# Patient Record
Sex: MALE | Race: BLACK OR AFRICAN AMERICAN | NOT HISPANIC OR LATINO | Employment: FULL TIME | URBAN - METROPOLITAN AREA
[De-identification: names, ages, dates, MRNs, and addresses within clinical notes are randomized per-mention and may not be internally consistent; named-entity substitution may affect disease eponyms.]

---

## 2021-04-27 ENCOUNTER — IMMUNIZATION (OUTPATIENT)
Dept: FAMILY PLANNING/WOMEN'S HEALTH CLINIC | Facility: IMMUNIZATION CENTER | Age: 61
End: 2021-04-27
Payer: COMMERCIAL

## 2021-04-27 DIAGNOSIS — Z23 ENCOUNTER FOR VACCINATION: Primary | ICD-10-CM

## 2021-04-27 PROCEDURE — 0001A PFIZER SARS-COV-2 VACCINE: CPT | Performed by: INTERNAL MEDICINE

## 2021-04-27 PROCEDURE — 91300 PFIZER SARS-COV-2 VACCINE: CPT | Performed by: INTERNAL MEDICINE

## 2021-05-21 ENCOUNTER — IMMUNIZATION (OUTPATIENT)
Dept: FAMILY PLANNING/WOMEN'S HEALTH CLINIC | Facility: IMMUNIZATION CENTER | Age: 61
End: 2021-05-21
Attending: INTERNAL MEDICINE
Payer: COMMERCIAL

## 2021-05-21 DIAGNOSIS — Z23 ENCOUNTER FOR VACCINATION: Primary | ICD-10-CM

## 2021-05-21 PROCEDURE — 0002A PFIZER SARS-COV-2 VACCINE: CPT | Performed by: INTERNAL MEDICINE

## 2021-05-21 PROCEDURE — 91300 PFIZER SARS-COV-2 VACCINE: CPT | Performed by: INTERNAL MEDICINE

## 2022-04-20 ENCOUNTER — OFFICE VISIT (OUTPATIENT)
Dept: MEDICAL GROUP | Facility: PHYSICIAN GROUP | Age: 62
End: 2022-04-20
Payer: COMMERCIAL

## 2022-04-20 ENCOUNTER — NON-PROVIDER VISIT (OUTPATIENT)
Dept: URGENT CARE | Facility: PHYSICIAN GROUP | Age: 62
End: 2022-04-20
Payer: COMMERCIAL

## 2022-04-20 ENCOUNTER — APPOINTMENT (OUTPATIENT)
Dept: RADIOLOGY | Facility: IMAGING CENTER | Age: 62
End: 2022-04-20
Attending: FAMILY MEDICINE
Payer: COMMERCIAL

## 2022-04-20 VITALS
HEIGHT: 68 IN | DIASTOLIC BLOOD PRESSURE: 98 MMHG | SYSTOLIC BLOOD PRESSURE: 192 MMHG | BODY MASS INDEX: 30.77 KG/M2 | WEIGHT: 203 LBS | RESPIRATION RATE: 16 BRPM | TEMPERATURE: 97.2 F | HEART RATE: 93 BPM | OXYGEN SATURATION: 95 %

## 2022-04-20 DIAGNOSIS — E78.5 DYSLIPIDEMIA: ICD-10-CM

## 2022-04-20 DIAGNOSIS — M51.26 LUMBAR HERNIATED DISC: ICD-10-CM

## 2022-04-20 DIAGNOSIS — Z12.11 SCREENING FOR COLON CANCER: ICD-10-CM

## 2022-04-20 DIAGNOSIS — I10 PRIMARY HYPERTENSION: ICD-10-CM

## 2022-04-20 PROCEDURE — 72100 X-RAY EXAM L-S SPINE 2/3 VWS: CPT | Mod: TC,FY | Performed by: RADIOLOGY

## 2022-04-20 PROCEDURE — 99204 OFFICE O/P NEW MOD 45 MIN: CPT | Performed by: FAMILY MEDICINE

## 2022-04-20 RX ORDER — AMLODIPINE BESYLATE 10 MG/1
10 TABLET ORAL DAILY
Qty: 90 TABLET | Refills: 3 | Status: SHIPPED | OUTPATIENT
Start: 2022-04-20 | End: 2023-05-24 | Stop reason: SDUPTHER

## 2022-04-20 RX ORDER — HYDROCHLOROTHIAZIDE 25 MG/1
25 TABLET ORAL DAILY
Qty: 30 TABLET | Refills: 5 | Status: SHIPPED | OUTPATIENT
Start: 2022-04-20 | End: 2022-09-29 | Stop reason: SDUPTHER

## 2022-04-20 ASSESSMENT — PATIENT HEALTH QUESTIONNAIRE - PHQ9: CLINICAL INTERPRETATION OF PHQ2 SCORE: 0

## 2022-04-20 NOTE — PATIENT INSTRUCTIONS
Mediterranean Diet  A Mediterranean diet refers to food and lifestyle choices that are based on the traditions of countries located on the [place]. This way of eating has been shown to help prevent certain conditions and improve outcomes for people who have chronic diseases, like kidney disease and heart disease.  What are tips for following this plan?  Lifestyle  · Cook and eat meals together with your family, when possible.  · Drink enough fluid to keep your urine clear or pale yellow.  · Be physically active every day. This includes:  ? Aerobic exercise like running or swimming.  ? Leisure activities like gardening, walking, or housework.  · Get 7-8 hours of sleep each night.  · If recommended by your health care provider, drink red wine in moderation. This means 1 glass a day for nonpregnant women and 2 glasses a day for men. A glass of wine equals 5 oz (150 mL).  Reading food labels    · Check the serving size of packaged foods. For foods such as rice and pasta, the serving size refers to the amount of cooked product, not dry.  · Check the total fat in packaged foods. Avoid foods that have saturated fat or trans fats.  · Check the ingredients list for added sugars, such as corn syrup.  Shopping  · At the grocery store, buy most of your food from the areas near the walls of the store. This includes:  ? Fresh fruits and vegetables (produce).  ? Grains, beans, nuts, and seeds. Some of these may be available in unpackaged forms or large amounts (in bulk).  ? Fresh seafood.  ? Poultry and eggs.  ? Low-fat dairy products.  · Buy whole ingredients instead of prepackaged foods.  · Buy fresh fruits and vegetables in-season from local farmers markets.  · Buy frozen fruits and vegetables in resealable bags.  · If you do not have access to quality fresh seafood, buy precooked frozen shrimp or canned fish, such as tuna, salmon, or sardines.  · Buy small amounts of raw or cooked vegetables, salads, or olives from  the deli or salad bar at your store.  · Stock your pantry so you always have certain foods on hand, such as olive oil, canned tuna, canned tomatoes, rice, pasta, and beans.  Cooking  · Cook foods with extra-virgin olive oil instead of using butter or other vegetable oils.  · Have meat as a side dish, and have vegetables or grains as your main dish. This means having meat in small portions or adding small amounts of meat to foods like pasta or stew.  · Use beans or vegetables instead of meat in common dishes like chili or lasagna.  · [place] with different cooking methods. Try roasting or broiling vegetables instead of steaming or sautéeing them.  · Add frozen vegetables to soups, stews, pasta, or rice.  · Add nuts or seeds for added healthy fat at each meal. You can add these to yogurt, salads, or vegetable dishes.  · Marinate fish or vegetables using olive oil, lemon juice, garlic, and fresh herbs.  Meal planning    · Plan to eat 1 vegetarian meal one day each week. Try to work up to 2 vegetarian meals, if possible.  · Eat seafood 2 or more times a week.  · Have healthy snacks readily available, such as:  ? Vegetable sticks with hummus.  ? Greek yogurt.  ? Fruit and nut trail mix.  · Eat balanced meals throughout the week. This includes:  ? Fruit: 2-3 servings a day  ? Vegetables: 4-5 servings a day  ? Low-fat dairy: 2 servings a day  ? Fish, poultry, or lean meat: 1 serving a day  ? Beans and legumes: 2 or more servings a week  ? Nuts and seeds: 1-2 servings a day  ? Whole grains: 6-8 servings a day  ? Extra-virgin olive oil: 3-4 servings a day  · Limit red meat and sweets to only a few servings a month  What are my food choices?  · Mediterranean diet  ? Recommended  § Grains: Whole-grain pasta. Brown rice. Bulgar wheat. Polenta. Couscous. Whole-wheat bread. Oatmeal. Quinoa.  § Vegetables: Artichokes. Beets. Broccoli. Cabbage. Carrots. Eggplant. Green beans. Chard. Kale. Spinach. Onions. Leeks. Peas. Squash.  Tomatoes. Peppers. Radishes.  § Fruits: Apples. Apricots. Avocado. Berries. Bananas. Cherries. Dates. Figs. Grapes. Pennie. Melon. Oranges. Peaches. Plums. Pomegranate.  § Meats and other protein foods: Beans. Almonds. Sunflower seeds. Pine nuts. Peanuts. Cod. [place]. Scallops. Shrimp. Tuna. Tilapia. Clams. Oysters. Eggs.  § Dairy: Low-fat milk. Cheese. Greek yogurt.  § Beverages: Water. Red wine. Herbal tea.  § Fats and oils: Extra virgin olive oil. Avocado oil. Grape seed oil.  § Sweets and desserts: Greek yogurt with honey. Baked apples. Poached pears. Trail mix.  § Seasoning and other foods: Basil. Cilantro. Coriander. Cumin. Mint. Parsley. Alo. Rosemary. Tarragon. Garlic. Oregano. Thyme. Pepper. Balsalmic vinegar. Tahini. Hummus. Tomato sauce. Olives. Mushrooms.  ? Limit these  § Grains: Prepackaged pasta or rice dishes. Prepackaged cereal with added sugar.  § Vegetables: Deep fried potatoes (french fries).  § Fruits: Fruit canned in syrup.  § Meats and other protein foods: Beef. Pork. Lamb. Poultry with skin. Hot dogs. [place]ouch.  § Dairy: Ice cream. Sour cream. Whole milk.  § Beverages: Juice. Sugar-sweetened soft drinks. Beer. Liquor and spirits.  § Fats and oils: Butter. Canola oil. Vegetable oil. Beef fat (tallow). Lard.  § Sweets and desserts: Cookies. Cakes. Pies. Candy.  § Seasoning and other foods: Mayonnaise. Premade sauces and marinades.  The items listed may not be a complete list. Talk with your dietitian about what dietary choices are right for you.  Summary  · The Mediterranean diet includes both food and lifestyle choices.  · Eat a variety of fresh fruits and vegetables, beans, nuts, seeds, and whole grains.  · Limit the amount of red meat and sweets that you eat.  · Talk with your health care provider about whether it is safe for you to drink red wine in moderation. This means 1 glass a day for nonpregnant women and 2 glasses a day for men. A glass of wine equals 5 oz (150 mL).  This information  is not intended to replace advice given to you by your health care provider. Make sure you discuss any questions you have with your health care provider.  Document Released: 08/10/2017 Document Revised: 08/17/2017 Document Reviewed: 08/10/2017  Elsevier Patient Education © 2020 [employer] Inc.

## 2022-04-20 NOTE — ASSESSMENT & PLAN NOTE
He had allergic reaction anaphylaxis after 4-5 years of taking lisinopril:   angioedema around his jaw, lips and nasal cavity.   He also has stress with work and white coat hypertension.     He continues to have the occasional swelling around his face even since off the lisinopril for years.     Will restart amlodipine 10 mg, hctz

## 2022-04-20 NOTE — ASSESSMENT & PLAN NOTE
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   Has back pain for about 5 years. last MRI was over 5 years ago. Pain radiates down his legs at times, mostly left side.   Pain in low back exacerbated last week, he was unable to walk upright and so he missed work for a week.   Pain does not wake him at night.   Off work 4/13/22 to 4/27/22    He will provide FMLA forms for me which I can fill for him next week.   He has been to a chiropractor it did help some.   He needs an xray and referral to PT.   He has reactions to medications including angioedema and anaphylaxis with lisinopril.

## 2022-04-22 NOTE — PROGRESS NOTES
"Subjective:   Eduardo Harrison is a 61 y.o. male here today for evaluation and management of:     Primary hypertension  He had allergic reaction anaphylaxis after 4-5 years of taking lisinopril:   angioedema around his jaw, lips and nasal cavity.   He also has stress with work and white coat hypertension.     He continues to have the occasional swelling around his face even since off the lisinopril for years.     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   Has back pain for about 5 years. last MRI was over 5 years ago. Pain radiates down his legs at times, mostly left side.   Pain in low back exacerbated last week, he was unable to walk upright and so he missed work for a week.   Pain does not wake him at night.   Off work 4/13/22 to 4/27/22    He will provide FMLA forms for me which I can fill for him next week.   He has been to a chiropractor it did help some.   He needs an xray and referral to PT.   He has reactions to medications including angioedema and anaphylaxis with lisinopril.            Current medicines (including changes today)  Current Outpatient Medications   Medication Sig Dispense Refill   • amLODIPine (NORVASC) 10 MG Tab Take 1 Tablet by mouth every day. 90 Tablet 3   • hydroCHLOROthiazide (HYDRODIURIL) 25 MG Tab Take 1 Tablet by mouth every day. For high blood pressure 30 Tablet 5     No current facility-administered medications for this visit.     He  has no past medical history on file.    ROS  No chest pain, no shortness of breath, no abdominal pain       Objective:     BP (!) 192/98   Pulse 93   Temp 36.2 °C (97.2 °F) (Temporal)   Resp 16   Ht 1.727 m (5' 8\")   Wt 92.1 kg (203 lb)   SpO2 95%  Body mass index is 30.87 kg/m².   Physical Exam:  Constitutional: Alert, no distress.  Skin: Warm, dry, good turgor, no rashes in visible areas.  Eye: Equal, round and reactive, conjunctiva clear, lids " normal.  ENMT: Lips without lesions, good dentition, oropharynx clear.  Neck: Trachea midline, no masses, no thyromegaly. No cervical or supraclavicular lymphadenopathy  Respiratory: Unlabored respiratory effort, lungs clear to auscultation, no wheezes, no ronchi.  Cardiovascular: Normal S1, S2, no murmur, no edema.  Abdomen: Soft, non-tender, no masses, no hepatosplenomegaly.  Psych: Alert and oriented x3, normal affect and mood.        Assessment and Plan:   The following treatment plan was discussed    1. Primary hypertension    - Comp Metabolic Panel; Future    2. Lumbar herniated disc    - DX-LUMBAR SPINE-2 OR 3 VIEWS; Future  - Referral to Physical Therapy    3. Screening for colon cancer    - Referral to GI for Colonoscopy    4. Dyslipidemia    - Lipid Profile; Future      Followup: Return in about 1 year (around 4/20/2023).

## 2022-04-22 NOTE — RESULT ENCOUNTER NOTE
Please advise patient that I reviewed back xray results. There is no fracture or compression fracture, there is mild to moderate arthritis. For now can use gentle stretches, tylenol, ice, till appt on 4/26/22  Wendi Eid M.D.

## 2022-04-26 ENCOUNTER — OFFICE VISIT (OUTPATIENT)
Dept: MEDICAL GROUP | Facility: PHYSICIAN GROUP | Age: 62
End: 2022-04-26
Payer: COMMERCIAL

## 2022-04-26 VITALS
HEART RATE: 100 BPM | DIASTOLIC BLOOD PRESSURE: 88 MMHG | BODY MASS INDEX: 30.46 KG/M2 | SYSTOLIC BLOOD PRESSURE: 138 MMHG | WEIGHT: 201 LBS | RESPIRATION RATE: 14 BRPM | OXYGEN SATURATION: 96 % | TEMPERATURE: 97.2 F | HEIGHT: 68 IN

## 2022-04-26 DIAGNOSIS — M51.26 LUMBAR HERNIATED DISC: ICD-10-CM

## 2022-04-26 DIAGNOSIS — I10 PRIMARY HYPERTENSION: ICD-10-CM

## 2022-04-26 DIAGNOSIS — E66.9 OBESITY (BMI 30-39.9): ICD-10-CM

## 2022-04-26 PROCEDURE — 99214 OFFICE O/P EST MOD 30 MIN: CPT | Performed by: FAMILY MEDICINE

## 2022-04-26 RX ORDER — CLOSTRIDIUM TETANI TOXOID ANTIGEN (FORMALDEHYDE INACTIVATED), CORYNEBACTERIUM DIPHTHERIAE TOXOID ANTIGEN (FORMALDEHYDE INACTIVATED), BORDETELLA PERTUSSIS TOXOID ANTIGEN (GLUTARALDEHYDE INACTIVATED), BORDETELLA PERTUSSIS FILAMENTOUS HEMAGGLUTININ ANTIGEN (FORMALDEHYDE INACTIVATED), BORDETELLA PERTUSSIS PERTACTIN ANTIGEN, AND BORDETELLA PERTUSSIS FIMBRIAE 2/3 ANTIGEN 5; 2; 2.5; 5; 3; 5 [LF]/.5ML; [LF]/.5ML; UG/.5ML; UG/.5ML; UG/.5ML; UG/.5ML
0.5 INJECTION, SUSPENSION INTRAMUSCULAR ONCE
Qty: 0.5 ML | Refills: 0 | Status: SHIPPED
Start: 2022-04-26 | End: 2022-04-26

## 2022-04-26 NOTE — PROGRESS NOTES
"Subjective:   Eduardo Harrison is a 61 y.o. male here today for evaluation and management of:     Primary hypertension  Sig improvement in BP from 192/98 to 138/88   He feels well with no chest pain or LE edema  He continues on amlodipine 10 mg and HCTZ 25 mg      Lumbar herniated disc  Release back to work with no restrictions paperwork provided.   Accommodation for days off for flare up of low back pain with sciatica provided he has completed PT and chiropractic therapy in the past.   occ flare up with severe pain and sciatica can cause him to be out of work from 5-14 days rarely. Maybe happens once in 3 months.          Current medicines (including changes today)  Current Outpatient Medications   Medication Sig Dispense Refill   • amLODIPine (NORVASC) 10 MG Tab Take 1 Tablet by mouth every day. 90 Tablet 3   • hydroCHLOROthiazide (HYDRODIURIL) 25 MG Tab Take 1 Tablet by mouth every day. For high blood pressure 30 Tablet 5     No current facility-administered medications for this visit.     He  has no past medical history on file.    ROS  No chest pain, no shortness of breath, no abdominal pain       Objective:     /88   Pulse 100   Temp 36.2 °C (97.2 °F) (Temporal)   Resp 14   Ht 1.727 m (5' 8\")   Wt 91.2 kg (201 lb)   SpO2 96%  Body mass index is 30.56 kg/m².   Physical Exam:  Constitutional: Alert, no distress.  Skin: Warm, dry, good turgor, no rashes in visible areas.  Eye: Equal, round and reactive, conjunctiva clear, lids normal.  ENMT: Lips without lesions, good dentition, oropharynx clear.  Neck: Trachea midline, no masses, no thyromegaly. No cervical or supraclavicular lymphadenopathy  Respiratory: Unlabored respiratory effort, lungs clear to auscultation, no wheezes, no ronchi.  Cardiovascular: Normal S1, S2, no murmur, no edema.  Abdomen: Soft, non-tender, no masses, no hepatosplenomegaly.  Psych: Alert and oriented x3, normal affect and mood.        Assessment and Plan:   The following " treatment plan was discussed    1. Primary hypertension      2. Lumbar herniated disc      3. Obesity (BMI 30-39.9)    - Patient identified as having weight management issue.  Appropriate orders and counseling given.      Followup: Return in about 3 months (around 7/26/2022) for HTN.

## 2022-04-26 NOTE — ASSESSMENT & PLAN NOTE
Sig improvement in BP from 192/98 to 138/88   He feels well with no chest pain or LE edema  He continues on amlodipine 10 mg and HCTZ 25 mg

## 2022-04-26 NOTE — ASSESSMENT & PLAN NOTE
Release back to work with no restrictions paperwork provided.   Accommodation for days off for flare up of low back pain with sciatica provided he has completed PT and chiropractic therapy in the past.   occ flare up with severe pain and sciatica can cause him to be out of work from 5-14 days rarely. Maybe happens once in 3 months.

## 2022-06-07 ENCOUNTER — OFFICE VISIT (OUTPATIENT)
Dept: MEDICAL GROUP | Facility: PHYSICIAN GROUP | Age: 62
End: 2022-06-07
Payer: COMMERCIAL

## 2022-06-07 VITALS
WEIGHT: 198 LBS | BODY MASS INDEX: 30.01 KG/M2 | HEIGHT: 68 IN | SYSTOLIC BLOOD PRESSURE: 130 MMHG | DIASTOLIC BLOOD PRESSURE: 80 MMHG | OXYGEN SATURATION: 98 % | RESPIRATION RATE: 16 BRPM | HEART RATE: 86 BPM | TEMPERATURE: 98.7 F

## 2022-06-07 DIAGNOSIS — I10 PRIMARY HYPERTENSION: ICD-10-CM

## 2022-06-07 DIAGNOSIS — M51.26 LUMBAR HERNIATED DISC: ICD-10-CM

## 2022-06-07 PROCEDURE — 99214 OFFICE O/P EST MOD 30 MIN: CPT | Performed by: NURSE PRACTITIONER

## 2022-06-07 NOTE — ASSESSMENT & PLAN NOTE
Chronic and stable condition.  Patient continues to report that he has lower back pain that waxes and wanes with occasional sciatica since 2015.  He denies any worsening of his condition however is requesting disability paperwork to be completed today in clinic.  He reports he occasionally gets flares which makes it difficult for him to go to work at [employer].  His occasional back pain flares have previously caused him to be out of work for 5 to 14 days and happens about once every 3 months.    Today he denies any lower back pain just a dull ache.  He denies any numbness or tingling running down his legs, saddle paresthesia, or loss of bowel or bladder control.  He is currently followed by a chiropractor.  He does use heat and ice therapy as needed.  Patient has not started physical therapy

## 2022-06-07 NOTE — PROGRESS NOTES
Chief Complaint   Patient presents with   • Paperwork       Subjective:     HPI:   Eduardo Harrison is a 61 y.o. male here to discuss the evaluation and management of:      Lumbar herniated disc  Chronic and stable condition.  Patient continues to report that he has lower back pain that waxes and wanes with occasional sciatica since 2015.  He denies any worsening of his condition however is requesting disability paperwork to be completed today in clinic.  He reports he occasionally gets flares which makes it difficult for him to go to work at [employer].  His occasional back pain flares have previously caused him to be out of work for 5 to 14 days and happens about once every 3 months.    Today he denies any lower back pain just a dull ache.  He denies any numbness or tingling running down his legs, saddle paresthesia, or loss of bowel or bladder control.  He is currently followed by a chiropractor.  He does use heat and ice therapy as needed.  Patient has not started physical therapy      Primary hypertension  Chronic condition.  Patient is currently taking amlodipine 10 mg daily and hydrochlorothiazide 25 mg daily.  He reports he tolerated medications well without any adverse effects.  He denies any headaches, dizziness, vision changes, or chest pain.  Blood pressure today in clinic is 148 /90with repeat blood pressure of 130/80.        ROS:  Gen: no fevers/chills, no changes in weight  Pulm: no sob, no cough  CV: no chest pain, no palpitations  GI: no nausea/vomiting, no diarrhea  MSk: Positive per HPI  Neuro: no headaches, no numbness/tingling      Allergies   Allergen Reactions   • Lisinopril        Current medicines (including changes today)  Current Outpatient Medications   Medication Sig Dispense Refill   • amLODIPine (NORVASC) 10 MG Tab Take 1 Tablet by mouth every day. 90 Tablet 3   • hydroCHLOROthiazide (HYDRODIURIL) 25 MG Tab Take 1 Tablet by mouth every day. For high blood pressure 30 Tablet 5     No current  "facility-administered medications for this visit.          Objective:       /80   Pulse 86   Temp 37.1 °C (98.7 °F) (Temporal)   Resp 16   Ht 1.727 m (5' 8\")   Wt 89.8 kg (198 lb)   SpO2 98%  Body mass index is 30.11 kg/m².    Physical Exam:  Constitutional: Well-developed and well-nourished male in NAD. Not diaphoretic. No distress.   Skin: warm, dry, intact  Cardiovascular: Regular rate and rhythm without murmur. Radial pulses are intact and equal bilaterally.  Pulmonary: Clear to ausculation. Normal effort. No rales, ronchi, or wheezing.  Back: Full ROM, 5/5 LE strength, sensation intact bilaterally in LE, mild  TTP over spinous processes lumbar spine and paraspinals.  SI joint negative, straight leg raise negative.  Patellar DTR's 2+.  Neurological: Alert and oriented x 3.   Psychiatric:  Behavior, mood, and affect are appropriate.      Assessment and Plan:     The following treatment plan was discussed:    1. Lumbar herniated disc  Chronic condition without exacerbation.  Patient can continue with cryo and heat therapy as needed.  Continue following with chiropractor.  Highly encourage patient to schedule appointment with physical therapy.  Disability paperwork was filled out with the patient and faxed to employer and scanned in epic.    2. Primary hypertension  Chronic condition.  Patient will continue with amlodipine and hydrochlorothiazide.    Any change or worsening of signs or symptoms, patient encouraged to follow-up or report to urgent care or emergency room for further evaluation. Patient verbalizes understanding and agrees.    Follow-Up: Return in about 7 weeks (around 7/26/2022) for Back pain, hypertension.      PLEASE NOTE: This dictation was created using voice recognition software. I have made every reasonable attempt to correct obvious errors, but I expect that there are errors of grammar and possibly content that I did not discover before finalizing the note.  "

## 2022-06-09 ENCOUNTER — APPOINTMENT (OUTPATIENT)
Dept: RADIOLOGY | Facility: IMAGING CENTER | Age: 62
End: 2022-06-09
Attending: STUDENT IN AN ORGANIZED HEALTH CARE EDUCATION/TRAINING PROGRAM
Payer: COMMERCIAL

## 2022-06-09 ENCOUNTER — OFFICE VISIT (OUTPATIENT)
Dept: URGENT CARE | Facility: PHYSICIAN GROUP | Age: 62
End: 2022-06-09
Payer: COMMERCIAL

## 2022-06-09 VITALS
HEIGHT: 68 IN | RESPIRATION RATE: 16 BRPM | SYSTOLIC BLOOD PRESSURE: 148 MMHG | WEIGHT: 200 LBS | DIASTOLIC BLOOD PRESSURE: 84 MMHG | TEMPERATURE: 97.1 F | BODY MASS INDEX: 30.31 KG/M2 | HEART RATE: 77 BPM | OXYGEN SATURATION: 99 %

## 2022-06-09 DIAGNOSIS — S69.92XA INJURY OF LEFT HAND, INITIAL ENCOUNTER: ICD-10-CM

## 2022-06-09 DIAGNOSIS — S63.502A SPRAIN OF LEFT WRIST, INITIAL ENCOUNTER: ICD-10-CM

## 2022-06-09 PROCEDURE — 99213 OFFICE O/P EST LOW 20 MIN: CPT | Performed by: STUDENT IN AN ORGANIZED HEALTH CARE EDUCATION/TRAINING PROGRAM

## 2022-06-09 PROCEDURE — 73110 X-RAY EXAM OF WRIST: CPT | Mod: TC,FY,LT | Performed by: STUDENT IN AN ORGANIZED HEALTH CARE EDUCATION/TRAINING PROGRAM

## 2022-06-09 NOTE — PROGRESS NOTES
"Subjective:   Eduardo Harrison is a 61 y.o. male who presents for Wrist Injury (X 1 week ago, had wrist wrapped. Feels popping around the wrist. )      HPI:  Pleasant 61-year-old male presents to clinic for left wrist injury that occurred 1 week ago.  Patient states he was working on a car and hit his wrist on a piece of metal.  He states that he initially had some pain but is not having any pain at this time.  He states that his only complaint right now is sometimes in his wrist he feels a little bit of popping and clicking and is here for an x-ray to make sure that nothing is broken and he may go to work without any restrictions.  Patient denies fever, chills, numbness, tingling, burning, radiation of pain into the hand or up the arm, reduced range of motion, weakness, dizziness, headache, nausea, vomiting, abdominal pain, chest pain, shortness of breath.      Medications:    • amLODIPine Tabs  • hydroCHLOROthiazide Tabs    Allergies: Lisinopril    Problem List: Eduardo Harrison does not have any pertinent problems on file.    Surgical History:  No past surgical history on file.    Past Social Hx: Eduardo Harrison  reports that he has never smoked. He has never used smokeless tobacco. He reports that he does not drink alcohol and does not use drugs.     Past Family Hx:  Eduardo Harrison family history is not on file.     Problem list, medications, and allergies reviewed by myself today in Epic.     Objective:     BP (!) 148/84   Pulse 77   Temp 36.2 °C (97.1 °F) (Temporal)   Resp 16   Ht 1.727 m (5' 8\")   Wt 90.7 kg (200 lb)   SpO2 99%   BMI 30.41 kg/m²     Physical Exam  Vitals reviewed.   Constitutional:       General: He is not in acute distress.     Appearance: Normal appearance.   Cardiovascular:      Rate and Rhythm: Normal rate and regular rhythm.      Pulses: Normal pulses.      Heart sounds: Normal heart sounds. No murmur heard.  Pulmonary:      Effort: Pulmonary effort is normal. No respiratory distress.      " Breath sounds: Normal breath sounds. No stridor. No wheezing, rhonchi or rales.   Musculoskeletal:      Right wrist: Normal.      Left wrist: No swelling, deformity, effusion, lacerations, tenderness, bony tenderness, snuff box tenderness or crepitus. Normal range of motion. Normal pulse.      Cervical back: Normal range of motion.   Lymphadenopathy:      Cervical: No cervical adenopathy.   Skin:     General: Skin is warm and dry.      Capillary Refill: Capillary refill takes less than 2 seconds.      Findings: No erythema, lesion or rash.   Neurological:      General: No focal deficit present.      Mental Status: He is alert and oriented to person, place, and time.         RADIOLOGY RESULTS   DX-WRIST-COMPLETE 3+ LEFT    Result Date: 6/9/2022 6/9/2022 10:27 AM HISTORY/REASON FOR EXAM:  Pain/Deformity Following Trauma. Popping sensation in the rest for 1 week TECHNIQUE/EXAM DESCRIPTION AND NUMBER OF VIEWS:  4 views of the  LEFT wrist. COMPARISON: None FINDINGS: MINERALIZATION: Mineralization is unremarkable for age. INJURY: No acute fracture or gross malalignment is seen. JOINTS: No erosive arthropathy is evident.     No radiographic evidence of acute traumatic injury.           Assessment/Plan:     Diagnosis and associated orders:     1. Injury of left hand, initial encounter  DX-WRIST-COMPLETE 3+ LEFT   2. Sprain of left wrist, initial encounter        Comments/MDM:     • Patient presents to clinic for evaluation of left wrist injury that occurred 1 week ago.  X-rays show no radiographic evidence of acute traumatic injury.  Discussed with patient that he most likely had a wrist sprain or wrist contusion that is healing appropriately.  • Patient's physical exam shows no abnormal findings.  Patient has no tenderness to palpation.  Patient has full active and passive range of motion.  Patient has 5 out of 5  strength and 5 out of 5 strength during wrist flexion and wrist extension.  • May use ibuprofen and  Tylenol as needed.  Patient may continue to ice or use heat on the wrist if he feels that this is helpful.  • Patient was given a work note stating that he may go to work without any restrictions.  • ED/return precautions were given.  Patient has good understanding the signs and symptoms of warrant immediate reevaluation.         Differential diagnosis, natural history, supportive care, and indications for immediate follow-up discussed.    Advised the patient to follow-up with the primary care physician for recheck, reevaluation, and consideration of further management.    Please note that this dictation was created using voice recognition software. I have made a reasonable attempt to correct obvious errors, but I expect that there are errors of grammar and possibly content that I did not discover before finalizing the note.    Electronically signed by Petey Haynes PA-C.

## 2022-06-09 NOTE — LETTER
[facility] [address] [address]     June 9, 2022    Patient: Eduardo Harrison   YOB: 1960   Date of Visit: 6/9/2022       To Whom It May Concern:    Eduardo Harrison was seen and treated in our department on 6/9/2022.  Patient may return to work at this time without restriction.    Sincerely,     Petey Haynes P.A.-C.

## 2022-09-29 RX ORDER — HYDROCHLOROTHIAZIDE 25 MG/1
25 TABLET ORAL DAILY
Qty: 30 TABLET | Refills: 1 | Status: SHIPPED | OUTPATIENT
Start: 2022-09-29 | End: 2022-12-20 | Stop reason: SDUPTHER

## 2022-09-29 NOTE — TELEPHONE ENCOUNTER
Received request via: Patient    Was the patient seen in the last year in this department? Yes    Does the patient have an active prescription (recently filled or refills available) for medication(s) requested? No    Last ov 4/20/22

## 2022-12-20 RX ORDER — HYDROCHLOROTHIAZIDE 25 MG/1
25 TABLET ORAL DAILY
Qty: 90 TABLET | Refills: 3 | Status: SHIPPED | OUTPATIENT
Start: 2022-12-20 | End: 2024-02-28 | Stop reason: SDUPTHER

## 2023-05-24 RX ORDER — AMLODIPINE BESYLATE 10 MG/1
10 TABLET ORAL DAILY
Qty: 90 TABLET | Refills: 3 | Status: SHIPPED | OUTPATIENT
Start: 2023-05-24 | End: 2024-02-28 | Stop reason: SDUPTHER

## 2023-05-30 ENCOUNTER — OFFICE VISIT (OUTPATIENT)
Dept: MEDICAL GROUP | Facility: PHYSICIAN GROUP | Age: 63
End: 2023-05-30
Payer: COMMERCIAL

## 2023-05-30 VITALS
RESPIRATION RATE: 16 BRPM | HEIGHT: 68 IN | BODY MASS INDEX: 32.13 KG/M2 | WEIGHT: 212 LBS | DIASTOLIC BLOOD PRESSURE: 88 MMHG | OXYGEN SATURATION: 97 % | HEART RATE: 86 BPM | SYSTOLIC BLOOD PRESSURE: 136 MMHG | TEMPERATURE: 97.8 F

## 2023-05-30 DIAGNOSIS — I10 PRIMARY HYPERTENSION: ICD-10-CM

## 2023-05-30 DIAGNOSIS — Z12.11 SCREENING FOR COLORECTAL CANCER: ICD-10-CM

## 2023-05-30 DIAGNOSIS — Z11.59 NEED FOR HEPATITIS C SCREENING TEST: ICD-10-CM

## 2023-05-30 DIAGNOSIS — Z23 NEED FOR VACCINATION: ICD-10-CM

## 2023-05-30 DIAGNOSIS — E66.9 OBESITY (BMI 30-39.9): ICD-10-CM

## 2023-05-30 DIAGNOSIS — E78.5 DYSLIPIDEMIA: ICD-10-CM

## 2023-05-30 DIAGNOSIS — Z12.12 SCREENING FOR COLORECTAL CANCER: ICD-10-CM

## 2023-05-30 DIAGNOSIS — H93.13 TINNITUS OF BOTH EARS: ICD-10-CM

## 2023-05-30 PROCEDURE — 3075F SYST BP GE 130 - 139MM HG: CPT | Performed by: FAMILY MEDICINE

## 2023-05-30 PROCEDURE — 3079F DIAST BP 80-89 MM HG: CPT | Performed by: FAMILY MEDICINE

## 2023-05-30 PROCEDURE — 90715 TDAP VACCINE 7 YRS/> IM: CPT | Performed by: FAMILY MEDICINE

## 2023-05-30 PROCEDURE — 90471 IMMUNIZATION ADMIN: CPT | Performed by: FAMILY MEDICINE

## 2023-05-30 PROCEDURE — 99214 OFFICE O/P EST MOD 30 MIN: CPT | Mod: 25 | Performed by: FAMILY MEDICINE

## 2023-05-30 ASSESSMENT — PATIENT HEALTH QUESTIONNAIRE - PHQ9: CLINICAL INTERPRETATION OF PHQ2 SCORE: 0

## 2023-05-30 NOTE — ASSESSMENT & PLAN NOTE
For last few weeks   Louder when he wakes up  No dizziness or headaches or vision changes.   No hearing loss.   No recent head trauma.   Has no gait instability or neurologic deficits.   Has got OTC ear drops.   If these do not help can try flonase.   May benefit from referral to audiology in future.

## 2023-05-30 NOTE — PROGRESS NOTES
"Subjective:   Eduardo Harrison is a 62 y.o. male here today for evaluation and management of:     Tinnitus of both ears  For last few weeks   Louder when he wakes up  No dizziness or headaches or vision changes.   No hearing loss.   No recent head trauma.   Has no gait instability or neurologic deficits.   Has got OTC ear drops.   If these do not help can try flonase.   May benefit from referral to audiology in future.       Primary hypertension  Controlled with hctz 25 mg, amlodipine 10 mg  He has no chest pain. LE edema.   He had refills provided.   Will check fasting labs.              Current medicines (including changes today)  Current Outpatient Medications   Medication Sig Dispense Refill    Zoster Vac Recomb Adjuvanted (SHINGRIX) 50 MCG/0.5ML Recon Susp Inject 0.5 mL into the shoulder, thigh, or buttocks one time for 1 dose. 0.5 mL 0    amLODIPine (NORVASC) 10 MG Tab Take 1 Tablet by mouth every day. 90 Tablet 3    hydroCHLOROthiazide (HYDRODIURIL) 25 MG Tab Take 1 Tablet by mouth every day. For high blood pressure 90 Tablet 3     No current facility-administered medications for this visit.     He  has no past medical history on file.    ROS  No chest pain, no shortness of breath, no abdominal pain       Objective:     /88   Pulse 86   Temp 36.6 °C (97.8 °F) (Temporal)   Resp 16   Ht 1.727 m (5' 8\")   Wt 96.2 kg (212 lb)   SpO2 97%  Body mass index is 32.23 kg/m².   Physical Exam:  Constitutional: Alert, no distress.  Skin: Warm, dry, good turgor, no rashes in visible areas.  Eye: Equal, round and reactive, conjunctiva clear, lids normal.  ENMT: Lips without lesions, good dentition, oropharynx clear.  Neck: Trachea midline, no masses, no thyromegaly. No cervical or supraclavicular lymphadenopathy  Respiratory: Unlabored respiratory effort, lungs clear to auscultation, no wheezes, no ronchi.  Cardiovascular: Normal S1, S2, no murmur, no edema.  Abdomen: Soft, non-tender, no masses, no " hepatosplenomegaly.  Psych: Alert and oriented x3, normal affect and mood.        Assessment and Plan:   The following treatment plan was discussed    1. Need for hepatitis C screening test  - HEP C VIRUS ANTIBODY; Future    2. Need for vaccination  - Tdap Vaccine =>6YO IM  - Zoster Vac Recomb Adjuvanted (SHINGRIX) 50 MCG/0.5ML Recon Susp; Inject 0.5 mL into the shoulder, thigh, or buttocks one time for 1 dose.  Dispense: 0.5 mL; Refill: 0    3. Screening for colorectal cancer  - Referral to GI for Colonoscopy    4. Tinnitus of both ears    5. Obesity (BMI 30-39.9)    6. Dyslipidemia  - Comp Metabolic Panel; Future  - Lipid Profile; Future    7. Primary hypertension      Followup: Return in about 3 months (around 8/30/2023) for Hypertension.

## 2023-05-30 NOTE — PATIENT INSTRUCTIONS
Please get fasting labs, fasting for 8-10 hours before next visit. You can make an appointment for the lab or walk in.   Lab hours [facility] location: Monday to Friday 6 am - 4 pm, Saturday 7 am -noon  Even if you lose your lab paperwork, you can still come in to get your lab done.

## 2023-05-30 NOTE — ASSESSMENT & PLAN NOTE
Controlled with hctz 25 mg, amlodipine 10 mg  He has no chest pain. LE edema.   He had refills provided.   Will check fasting labs.

## 2024-01-31 NOTE — TELEPHONE ENCOUNTER
Pt came in for a refill on the   hydroCHLOROthiazide (HYDRODIURIL) 25 MG Tab, pharmacy sent over a request for refill but has not heard anything back yet. Please advise.

## 2024-01-31 NOTE — TELEPHONE ENCOUNTER
Received request via: Pharmacy    Was the patient seen in the last year in this department? Yes    Does the patient have an active prescription (recently filled or refills available) for medication(s) requested? No    [facility]     Does the patient have skilled nursing Plus and need 100 day supply (blood pressure, diabetes and cholesterol meds only)? Patient does not have [facility]    Last OV  [id number]

## 2024-02-28 ENCOUNTER — OFFICE VISIT (OUTPATIENT)
Dept: MEDICAL GROUP | Facility: PHYSICIAN GROUP | Age: 64
End: 2024-02-28

## 2024-02-28 VITALS
HEART RATE: 93 BPM | SYSTOLIC BLOOD PRESSURE: 156 MMHG | TEMPERATURE: 98.3 F | DIASTOLIC BLOOD PRESSURE: 90 MMHG | BODY MASS INDEX: 33.04 KG/M2 | HEIGHT: 68 IN | OXYGEN SATURATION: 96 % | WEIGHT: 218 LBS | RESPIRATION RATE: 18 BRPM

## 2024-02-28 DIAGNOSIS — I10 PRIMARY HYPERTENSION: ICD-10-CM

## 2024-02-28 DIAGNOSIS — Z76.0 MEDICATION REFILL: ICD-10-CM

## 2024-02-28 PROCEDURE — 3080F DIAST BP >= 90 MM HG: CPT | Performed by: NURSE PRACTITIONER

## 2024-02-28 PROCEDURE — 3077F SYST BP >= 140 MM HG: CPT | Performed by: NURSE PRACTITIONER

## 2024-02-28 PROCEDURE — 99214 OFFICE O/P EST MOD 30 MIN: CPT | Performed by: NURSE PRACTITIONER

## 2024-02-28 RX ORDER — AMLODIPINE BESYLATE 10 MG/1
10 TABLET ORAL DAILY
Qty: 90 TABLET | Refills: 3 | Status: SHIPPED | OUTPATIENT
Start: 2024-02-28

## 2024-02-28 RX ORDER — HYDROCHLOROTHIAZIDE 25 MG/1
25 TABLET ORAL DAILY
Qty: 90 TABLET | Refills: 3 | Status: SHIPPED | OUTPATIENT
Start: 2024-02-28

## 2024-02-28 ASSESSMENT — PATIENT HEALTH QUESTIONNAIRE - PHQ9: CLINICAL INTERPRETATION OF PHQ2 SCORE: 0

## 2024-02-28 NOTE — ASSESSMENT & PLAN NOTE
Pt ran out of his bp meds recently; bp at home has been around 150s as it is today  Pt doesn't get much salt or caffeine  Is waiting on a background check w/his new employer [employer] and doesn't have insurance  Will use [employer] card to save money at the pharmacy

## 2024-02-28 NOTE — PROGRESS NOTES
"Subjective:     CC:   Chief Complaint   Patient presents with    Medication Refill       HPI:   Edaurdo presents today with    Medication refill  Pt ran out of his bp meds recently; bp at home has been around 150s as it is today  Pt doesn't get much salt or caffeine  Is waiting on a background check w/his new employer [employer] and doesn't have insurance  Will use [employer] card to save money at the pharmacy              ROS per HPI    Objective:     Exam:  BP (!) 156/90 (BP Location: Left arm, Patient Position: Sitting, BP Cuff Size: Adult long)   Pulse 93   Temp 36.8 °C (98.3 °F) (Temporal)   Resp 18   Ht 1.727 m (5' 8\")   Wt 98.9 kg (218 lb) Comment: with boots on  SpO2 96%   BMI 33.15 kg/m²  Body mass index is 33.15 kg/m².    Physical Exam:  Constitutional: Well-developed and well-nourished female. Not diaphoretic. No distress.   Skin: warm, dry, intact, no evidence of rash or concerning lesions  Head: Atraumatic without lesions.  Eyes: Conjunctivae are normal. Pupils are equal, round. No scleral icterus.   Ears:  External ears unremarkable.   Neck: Supple, trachea midline. No thyromegaly present. No cervical or supraclavicular lymphadenopathy.  Cardiovascular: Regular rate and rhythm without murmur.   Pulmonary: Clear to ausculation. Normal effort. No rales, ronchi, or wheezing.  Extremities: No cyanosis, clubbing, erythema, nor edema.   Neurological: Alert and oriented x 3.   Psychiatric:  Behavior, mood, and affect are appropriate.        Assessment & Plan:     63 y.o. male with the following -     1. Primary hypertension  - amLODIPine (NORVASC) 10 MG Tab; Take 1 Tablet by mouth every day.  Dispense: 90 Tablet; Refill: 3  - hydroCHLOROthiazide 25 MG Tab; Take 1 Tablet by mouth every day. For high blood pressure  Dispense: 90 Tablet; Refill: 3    2. Medication refill   See #1      Return in about 4 weeks (around 3/27/2024) for gen check in. As scheduled     Please note that this dictation was created using " voice recognition software. I have made every reasonable attempt to correct obvious errors, but I expect that there are errors of grammar and possibly content that I did not discover before finalizing the note.

## 2024-03-18 ENCOUNTER — NON-PROVIDER VISIT (OUTPATIENT)
Dept: URGENT CARE | Facility: PHYSICIAN GROUP | Age: 64
End: 2024-03-18

## 2024-03-18 DIAGNOSIS — Z02.1 PRE-EMPLOYMENT DRUG SCREENING: ICD-10-CM

## 2024-03-18 LAB
AMP AMPHETAMINE: NORMAL
COC COCAINE: NORMAL
INT CON NEG: NORMAL
INT CON POS: NORMAL
MET METHAMPHETAMINES: NORMAL
OPI OPIATES: NORMAL
PCP PHENCYCLIDINE: NORMAL
POC DRUG COMMENT 753798-OCCUPATIONAL HEALTH: NORMAL
THC: NORMAL

## 2024-03-18 PROCEDURE — 80305 DRUG TEST PRSMV DIR OPT OBS: CPT

## 2025-02-19 DIAGNOSIS — I10 PRIMARY HYPERTENSION: ICD-10-CM

## 2025-02-19 RX ORDER — HYDROCHLOROTHIAZIDE 25 MG/1
25 TABLET ORAL DAILY
Qty: 90 TABLET | Refills: 3 | Status: SHIPPED | OUTPATIENT
Start: 2025-02-19

## 2025-02-19 RX ORDER — AMLODIPINE BESYLATE 10 MG/1
10 TABLET ORAL DAILY
Qty: 90 TABLET | Refills: 3 | Status: SHIPPED | OUTPATIENT
Start: 2025-02-19

## 2025-02-19 NOTE — TELEPHONE ENCOUNTER
Received request via: Patient    Was the patient seen in the last year in this department? Yes   Last OV 02 28 2024  Does the patient have an active prescription (recently filled or refills available) for medication(s) requested? No    [facility]    Does the patient have senior living Plus and need 100-day supply? (This applies to ALL medications) Patient does not have [facility]